# Patient Record
Sex: MALE | Race: WHITE
[De-identification: names, ages, dates, MRNs, and addresses within clinical notes are randomized per-mention and may not be internally consistent; named-entity substitution may affect disease eponyms.]

---

## 2017-08-07 ENCOUNTER — HOSPITAL ENCOUNTER (EMERGENCY)
Dept: HOSPITAL 36 - ER | Age: 57
Discharge: HOME | End: 2017-08-07
Payer: MEDICAID

## 2017-08-07 DIAGNOSIS — E87.6: Primary | ICD-10-CM

## 2017-08-07 DIAGNOSIS — Z88.6: ICD-10-CM

## 2017-08-07 DIAGNOSIS — E78.5: ICD-10-CM

## 2017-08-07 DIAGNOSIS — K46.9: ICD-10-CM

## 2017-08-07 LAB
ALBUMIN/GLOB SERPL: 1.9 {RATIO} (ref 1–1.8)
ALP SERPL-CCNC: 82 U/L (ref 34–104)
ALT SERPL-CCNC: 27 U/L (ref 7–52)
ANION GAP SERPL CALC-SCNC: 6.6 MMOL/L (ref 7–16)
AST SERPL-CCNC: 42 U/L (ref 13–39)
BASOPHILS NFR BLD AUTO: 0.1 % (ref 0–2)
BILIRUB SERPL-MCNC: 0.5 MG/DL (ref 0.3–1)
BUN SERPL-MCNC: 20 MG/DL (ref 7–25)
BUN/CREAT SERPL: 25
CALCIUM SERPL-MCNC: 9.2 MG/DL (ref 8.6–10.3)
CHLORIDE SERPL-SCNC: 104 MEQ/L (ref 98–107)
CO2 SERPL-SCNC: 26.2 MEQ/L (ref 21–31)
CREAT SERPL-MCNC: 0.8 MG/DL (ref 0.7–1.3)
EOSINOPHIL NFR BLD AUTO: 4.6 % (ref 0–5)
ERYTHROCYTE [DISTWIDTH] IN BLOOD BY AUTOMATED COUNT: 12.6 % (ref 11.5–20)
GLOBULIN SER-MCNC: 2.2 GM/DL
GLUCOSE SERPL-MCNC: 105 MG/DL (ref 70–105)
HCT VFR BLD CALC: 35 % (ref 39–49)
HGB BLD-MCNC: 12.1 GM/DL (ref 13.2–17.3)
LYMPHOCYTES NFR BLD AUTO: 34.4 % (ref 20–50)
MCH RBC QN AUTO: 31 PG (ref 26–30)
MCHC RBC AUTO-ENTMCNC: 34.7 PG (ref 28–36)
MCV RBC AUTO: 89.3 FL (ref 80–99)
MONOCYTES NFR BLD AUTO: 11.3 % (ref 2–10)
NEUTROPHILS # BLD: 3.8 TH/CMM (ref 1.8–8)
NEUTROPHILS NFR BLD AUTO: 49.6 % (ref 40–80)
PLATELET # BLD: 276 TH/CMM (ref 150–400)
PMV BLD AUTO: 7.5 FL
POTASSIUM SERPL-SCNC: 2.9 MEQ/L (ref 3.5–5.1)
RBC # BLD AUTO: 3.92 MIL/CMM (ref 4.3–5.7)
SODIUM SERPL-SCNC: 134 MEQ/L (ref 136–145)
WBC # BLD AUTO: 7.5 TH/CMM (ref 4.8–10.8)

## 2017-08-07 PROCEDURE — Z7502: HCPCS

## 2017-08-07 NOTE — ED PHYSICIAN CHART
Chief Complaint/HPI





- Patient Information


Date Seen:: 08/07/17


Time Seen:: 21:21


Chief Complaint:: ABDOMINAL PAIN


History of Present Illness:: 





THIS IS A 57 YO MALE WHO STATES THAT HE HAS A HERNIA AND IT IS CAUSING HIM 

PAIN. HE DENIES FEVER, NAUSEA AND VOMITING.


Allergies:: 


 Allergies











Allergy/AdvReac Type Severity Reaction Status Date / Time


 


morphine Allergy   Verified 08/07/17 21:22











Vitals:: 


 Vital Signs - 8 hr











  08/07/17





  21:15


 


Temp 98.1 F


 


HR 71


 


RR 18


 


/79


 


O2 Sat % 99











Historian:: Patient


Review:: Nurse's Note Reviewed, Old Chart Reviewed, Transfer documents Reviewed





Review of Systems





- Review of Systems


General/Constitutional: No fever, No chills, No weight loss, No weakness, No 

diaphoresis, No edema, No loss of appetite


Skin: No skin lesions, No rash, No bruising


Head: No headache, No light-headedness


Eyes: No loss of vision, No pain, No diplopia


ENT: No earache, No nasal drainage, No sore throat, No tinnitus


Neck: No neck pain, No swelling, No thyromegaly, No stiffness, No mass noted


Cardio Vascular: No chest pain, No palpitations, No PND, No orthopnea, No edema


Pulmonary: No SOB, No cough, No sputum, No wheezing


GI: No nausea, No vomiting, No diarrhea, Pain, No melena, No hematochezia, No 

constipation, No hematemesis, Other (RIGHT INQUINAL HERNIA )


G/U: No dysuria, No frequency, No hematuria


Musculoskeletal: No bone or joint pain, No back pain, No muscle pain


Endocrine: No polyuria, No polydipsia


Psychiatric: No prior psych history, No depression, No anxiety, No suicidal 

ideation


Hematopoietic: No bruising, No lymphadenopathy


Allergic/Immuno: No urticaria, No angioedema


Neurological: No syncope, No focal symptoms, No weakness, No paresthesia, No 

headache, No seizure, No dizziness, No confusion, No vertigo





Past Medical History





- Past Medical History


Obtainable: Yes


Past Medical History: Dyslipidemia, Other (PSYCHOSIS)


Social History: Non Smoker, No Alcohol, No Drug Use, Single


Surgical History: None


Psychiatricy History: Schizophrenia


Medication: Reviewed





Family Medical History





- Family Member


  ** Mother


History Unknown: Yes





Physical Exam





- Physical Examination


General/Constitutional: Awake, Well-developed, well-nourished, Alert, No 

distress, GCS 15, Non-toxic appearing, Ambulatory


Head: Atraumatic


Eyes: Lids, conjuctiva normal, PERRL, EOMI


Skin: Nl inspection, No rash, No skin lesions, No ecchymosis, Well hydrated, No 

lymphadenopathy


ENMT: External ears, nose nl, Nasal exam nl, Lips, teeth, gums nl


Neck: Nontender, Full ROM w/o pain, No JVD, No nuchal rigidity, No bruit, No 

mass, No stridor


Respiratory: Nl effort/Exclusion, Clear to Auscultation, No Wheeze/Rhonchi/Rales


Cardio Vascular: RRR, No murmur, gallop, rubs, NL S1 S2


GI: No organomegaly, Normal BS's, Nondistended, No mass/bruits, No McBurney 

tenderness


Other GI comments:: 





RIGHT INQUINAL HERNIA (REDUCIBLE) 


THE AREA IS NOT RED, HOT WITH NO SWELLING BUT TENDER ON DEEP PALPATION


: No CVA tenderness


Extremities: No tenderness or effusion, Full ROM, normal strength in all 

extremities, No edema, Normal digits & nails


Neuro/Psych: Alert/oriented, DTR's symmetric, Normal sensory exam, Normal motor 

strength, Normal gait, No focal deficits


Other Neuro/Psych comments:: 





THIS PATIENT ALSO HAS SOME MENTAL ISSUES AND I SPEAKING WITH HIMSELF


Misc: normal gait, Normal back, No paraspinal tenderness





Labs/Radiology/EKG Results





- Lab Results


Results: 


 Laboratory Tests











  08/07/17





  21:53


 


WBC  7.5  D


 


RBC  3.92 L


 


Hgb  12.1 L


 


Hct  35.0 L


 


MCV  89.3


 


MCH  31.0 H


 


MCHC Differential  34.7


 


RDW  12.6


 


Plt Count  276


 


MPV  7.5


 


Neutrophils %  49.6


 


Lymphocytes %  34.4


 


Monocytes %  11.3 H


 


Eosinophils %  4.6


 


Basophils %  0.1








 Abnormal Lab Results











  08/07/17 08/07/17 08/07/17





  21:53 21:53 21:53


 


WBC  7.5  D  


 


RBC  3.92 L  


 


Hgb  12.1 L  


 


Hct  35.0 L  


 


MCV  89.3  


 


MCH  31.0 H  


 


MCHC Differential  34.7  


 


RDW  12.6  


 


Plt Count  276  


 


MPV  7.5  


 


Neutrophils %  49.6  


 


Lymphocytes %  34.4  


 


Monocytes %  11.3 H  


 


Eosinophils %  4.6  


 


Basophils %  0.1  


 


Sodium   134 L 


 


Potassium   2.9 L* 


 


Chloride   104 


 


Carbon Dioxide   26.2 


 


Anion Gap   6.6 L 


 


BUN   20 


 


Creatinine   0.8 


 


Est GFR ( Amer)   > 60.0 


 


Est GFR (Non-Af Amer)   > 60.0 


 


BUN/Creatinine Ratio   25.0 


 


Glucose   105 


 


Calcium   9.2 


 


Total Bilirubin   0.5 


 


AST   42 H 


 


ALT   27 


 


Alkaline Phosphatase   82 


 


Troponin I    0.01


 


Total Protein   6.4 


 


Albumin   4.2 


 


Globulin   2.2 


 


Albumin/Globulin Ratio   1.9 H 














Assessment





- Assessment


General Assessment: 





LOW KCL


HERNIA ( old) and not incarcerated)





ED Septic Shock





- .


Is Septic Shock (SBP<90, OR Lactate>4 mmol\L) present?: No





- <6hrs of presentation:


Vital Signs: 


 Vital Signs - 8 hr











  08/07/17





  21:15


 


Temp 98.1 F


 


HR 71


 


RR 18


 


/79


 


O2 Sat % 99














Reassessment (Disposition)





- Reassessment


Reassessment Condition:: Improved





- Diagnosis


Diagnosis:: 





hypokalemia


hernia pain





- Aftercare/Follow up Instructions


Aftercare/Follow-Up Instructions:: Counseled pt regarding lab results/diagnosis 

& need follow up, Refer to Discharge Instructions, Counseled pt & family 

regarding lab results/diagnosis & need follow up





- Patient Disposition


Discharge/Transfer:: Home


Condition at Disposition:: Improved





ED Discharge Plan





- Patient Disposition


Admit/Discharge/Transfer: PT DISCHARGED HOME

## 2018-02-21 NOTE — ED PHYSICIAN CHART
ED Chief Complaint/HPI





- Patient Information


Date Seen:: 02/21/18


Time Seen:: 11:15


Chief Complaint:: right inguinal hernia


History of Present Illness:: 





Patient has a painful right viral hernia.  He has no vomiting.  He's had 

previous right inguinal hernia repair.


Allergies:: 


 Allergies











Allergy/AdvReac Type Severity Reaction Status Date / Time


 


morphine Allergy   Verified 12/14/17 14:13











Vitals:: 


 Vital Signs - 8 hr











  02/21/18





  10:56


 


Temp 98.1 F


 


HR 61


 


RR 16


 


/94


 


O2 Sat % 98











Historian:: Patient


Review:: Nurse's Note Reviewed





ED Review of Systems





- Review of Systems


General/Constitutional: No fever, No chills


Skin: No skin lesions


Head: No headache


Eyes: No loss of vision


ENT: No earache


Neck: No neck pain


Cardio Vascular: No chest pain, No palpitations


Pulmonary: No SOB


GI: No nausea, No vomiting, No diarrhea, Pain


G/U: No dysuria


Musculoskeletal: No bone or joint pain, No back pain, No muscle pain


Endocrine: No polyuria, No polydipsia


Psychiatric: No prior psych history, No depression, No anxiety


Hematopoietic: No bruising, No lymphadenopathy


Allergic/Immuno: No urticaria, No angioedema


Neurological: No syncope, No focal symptoms





ED Past Medical History





- Past Medical History


Past Medical History: No significant medical hx


Family History: None


Social History: Smoker, No Alcohol


Surgical History: Hernia


Psychiatricy History: None





Family Medical History





- Family Member


  ** Mother


History Unknown: Yes


Ethnicity: Non-


Living Status: Unknown





ED Physical Exam





- Physical Examination


General/Constitutional: Well-developed, well-nourished, Alert, No distress


Head: Atraumatic


Eyes: Lids, conjuctiva normal


Skin: Nl inspection, No rash, No skin lesions, No ecchymosis


ENMT: External ears, nose nl, Oropharynx nl


Neck: No nuchal rigidity


Respiratory: Nl effort/Exclusion, Clear to Auscultation


Cardio Vascular: RRR, No murmur, gallop, rubs


GI: No organomegaly, Nondistended


Other GI comments:: 





Large right inguinal hernia


: No CVA tenderness


Extremities: Normal digits & nails


Neuro/Psych: Alert/oriented, Normal sensory exam


Misc: Normal back





ED Assessment





- Assessment


General Assessment: 





I instructed patient to follow up with a general surgeon.  Patient's right 

lateral hernia is not strangulated so emergency surgery is not indicated.





ED Septic Shock





- .


Is Septic Shock (SBP<90, OR Lactate>4 mmol\L) present?: No





- <6hrs of presentation:


Vital Signs: 


 Vital Signs - 8 hr











  02/21/18





  10:56


 


Temp 98.1 F


 


HR 61


 


RR 16


 


/94


 


O2 Sat % 98














ED Reassessment (Disposition)





- Reassessment


Reassessment Condition:: Unchanged





- Diagnosis


Diagnosis:: 





Right bilateral hernia





- Aftercare/Follow up Instructions


Aftercare/Follow-Up Instructions:: Refer to Discharge Instructions





- Patient Disposition


Discharge/Transfer:: Home


Condition at Disposition:: Stable, Unchanged

## 2019-02-19 NOTE — ED PHYSICIAN CHART
ED Chief Complaint/HPI





- Patient Information


Date Seen:: 02/19/19


Time Seen:: 11:50


Chief Complaint:: Insect Bite


History of Present Illness:: 





onset x 3 days of RRF redness, swelling, and erythema after an insect bite 3 

days ago; pt denies trauma, LOC, ALOC, AMS, H/As, S/T, neck pain, cough, C/P, 

SOB, Abd. Pain, A/N/V/D/C, weakness, dizziness, pain, paresthesias, vertigo, 

bleeding, or urinary s/s; pt's last tetanus shot: > 5 years


Allergies:: 


 Allergies











Allergy/AdvReac Type Severity Reaction Status Date / Time


 


morphine Allergy   Verified 12/14/17 14:13











Vitals:: 


 Vital Signs - 8 hr











  02/19/19 02/19/19





  11:54 12:24


 


Temp 98.7 F 98.7 F


 


HR 85 85


 


RR 18 16


 


/94 149/94


 


O2 Sat % 98 98











Historian:: Patient, EMS


Review:: Nurse's Note Reviewed, Old Chart Reviewed, EMS run form Reviewed





ED Review of Systems





- Review of Systems


General/Constitutional: No fever, No chills, No weight loss, No weakness, No 

diaphoresis, No edema, No loss of appetite


Skin: No skin lesions, No rash, No bruising


Head: No headache, No light-headedness


Eyes: No loss of vision, No pain, No diplopia


ENT: No earache, No nasal drainage, No sore throat, No tinnitus


Neck: No neck pain, No swelling, No thyromegaly, No stiffness, No mass noted


Cardio Vascular: No chest pain, No palpitations, No PND, No orthopnea, No edema


Pulmonary: No SOB, No cough, No sputum, No wheezing


GI: No nausea, No vomiting, No diarrhea, No pain, No melena, No hematochezia, 

No constipation, No hematemesis


G/U: No dysuria, No frequency, No hematuria, No nacturia


Musculoskeletal: No bone or joint pain, No back pain, No muscle pain


Endocrine: No polyuria, No polydipsia


Psychiatric: No prior psych history, No depression, No anxiety, No suicidal 

ideation, No homicidal ideation, No auditory hallucination, No visual 

hallucination


Hematopoietic: No bruising, No lymphadenopathy


Allergic/Immuno: No urticaria, No angioedema


Neurological: No syncope, No focal symptoms, No weakness, No paresthesia, No 

headache, No seizure, No dizziness, No confusion, No vertigo





ED Past Medical History





- Past Medical History


Obtainable: Yes


Past Medical History: HTN


Family History: HTN


Social History: Non Smoker, No Alcohol, No Drug Use, Single, Homeless


Surgical History: Hernia


Psychiatricy History: None


Medication: Reviewed





Family Medical History





- Family Member


  ** Mother


History Unknown: Yes


Ethnicity: Non-


Living Status: Unknown





ED Physical Exam





- Physical Examination


General/Constitutional: Awake, Well-developed, well-nourished, Alert, No 

distress, GCS 15, Non-toxic appearing, Ambulatory


Head: Atraumatic


Eyes: Lids, conjuctiva normal, PERRL, EOMI


Skin: Nl inspection, No rash, No skin lesions, No ecchymosis, Well hydrated, No 

lymphadenopathy


Other Skin comments:: 





RRF: Localized Cellulitis around a PW at dorsal DIP of RRF; no FBs; no septic 

joints; no joint tenderness; full active ROMs of all joints; no ligament 

instability; no ligament laxity; good motor, tendon, and sensory functions; 

good NV functions


ENMT: External ears, nose nl, TM canals nl, Nasal exam nl, Lips, teeth, gums nl

, Oropharynx nl, Tonsils nl


Neck: Nontender, Full ROM w/o pain, No JVD, No nuchal rigidity, No bruit, No 

mass, No stridor


Other Neck comments:: 





supple; no meningeal signs; no cervical tenderness


Respiratory: Nl effort/Exclusion, Clear to Auscultation, No Wheeze/Rhonchi/Rales


Cardio Vascular: RRR, No murmur, gallop, rubs, NL S1 S2, Carotid/Femoral/Distal 

pulses equal bilaterally


GI: No tenderness/rebounding/guarding, No organomegaly, No hernia, Normal BS's, 

Nondistended, No mass/bruits, No McBurney tenderness, Rectum exam nl


Other GI comments:: 





no pulsatile masses


: No CVA tenderness


Extremities: No tenderness or effusion, Full ROM, normal strength in all 

extremities, No edema, Normal digits & nails


Other Extremities comments:: 





as above


Neuro/Psych: Alert/oriented, DTR's symmetric, Normal sensory exam, Normal motor 

strength, Judgement/insight normal, Mood normal, Normal gait, No focal deficits


Other Neuro/Psych comments:: 





no focal signs


Misc: Normal back, No paraspinal tenderness





ED Assessment


Comments: 





Td 0.5cc IM given





ED Septic Shock





- .


Is Septic Shock (SBP<90, OR Lactate>4 mmol\L) present?: No





- <6hrs of presentation:


Vital Signs: 


 Vital Signs - 8 hr











  02/19/19 02/19/19





  11:54 12:24


 


Temp 98.7 F 98.7 F


 


HR 85 85


 


RR 18 16


 


/94 149/94


 


O2 Sat % 98 98














ED Reassessment (Disposition)





- Reassessment


Reassessment:: 





pt is asymptomatic upon discharge


Reassessment Condition:: Improved





- Diagnosis


Diagnosis:: 





RRF Cellulitis; RRF Wound; Insect Bite Wound; Right Ring Finger Localized 

Cellulitis; Insect Bite; Hypertension





- Aftercare/Follow up Instructions


Aftercare/Follow-Up Instructions:: Counseled pt regarding lab results/diagnosis 

& need follow up, Refer to Discharge Instructions, Counseled pt & family 

regarding lab results/diagnosis & need follow up


Medication Prescribed:: 





Rx: Keflex 500mg po qid x 10 days; Neosporin Ointment bid x 14 days; Warm 

Compresses/Heating Pads to affected areas; Skin Care Instructions; take all 

medications as prescribed





- Patient Disposition


Discharge/Transfer:: Home


Condition at Disposition:: Stable, Improved (RTER prn if existing s/s reoccur 

and/or get worse and/or any other new s/s occur; Have Blood Pressure re-checked 

in one day by PMD; ACIs given for all above Dx; Refer to Hand Specialist/

Vascular Surgeon/Orthopedist/Internist ASAP; F/U with PMD in one day or prn; 

RTER prn if concerned)